# Patient Record
Sex: MALE | Race: WHITE | NOT HISPANIC OR LATINO | Employment: FULL TIME | URBAN - METROPOLITAN AREA
[De-identification: names, ages, dates, MRNs, and addresses within clinical notes are randomized per-mention and may not be internally consistent; named-entity substitution may affect disease eponyms.]

---

## 2022-01-06 ENCOUNTER — APPOINTMENT (OUTPATIENT)
Dept: CT IMAGING | Facility: HOSPITAL | Age: 52
End: 2022-01-06

## 2022-01-06 ENCOUNTER — HOSPITAL ENCOUNTER (OUTPATIENT)
Facility: HOSPITAL | Age: 52
Setting detail: OBSERVATION
Discharge: HOME | End: 2022-01-07
Attending: EMERGENCY MEDICINE | Admitting: EMERGENCY MEDICINE

## 2022-01-06 DIAGNOSIS — R07.9 CHEST PAIN, UNSPECIFIED TYPE: Primary | ICD-10-CM

## 2022-01-06 DIAGNOSIS — B34.8 INFECTION DUE TO HUMAN METAPNEUMOVIRUS (HMPV): ICD-10-CM

## 2022-01-06 LAB
ALBUMIN SERPL-MCNC: 4.6 G/DL (ref 3.5–5.2)
ALBUMIN/GLOB SERPL: 1.5 G/DL
ALP SERPL-CCNC: 94 U/L (ref 39–117)
ALT SERPL W P-5'-P-CCNC: 33 U/L (ref 1–41)
ANION GAP SERPL CALCULATED.3IONS-SCNC: 13 MMOL/L (ref 5–15)
AST SERPL-CCNC: 21 U/L (ref 1–40)
B PARAPERT DNA SPEC QL NAA+PROBE: NOT DETECTED
B PERT DNA SPEC QL NAA+PROBE: NOT DETECTED
BASOPHILS # BLD AUTO: 0.1 10*3/MM3 (ref 0–0.2)
BASOPHILS NFR BLD AUTO: 0.9 % (ref 0–1.5)
BILIRUB SERPL-MCNC: 0.3 MG/DL (ref 0–1.2)
BUN SERPL-MCNC: 13 MG/DL (ref 6–20)
BUN/CREAT SERPL: 13.4 (ref 7–25)
C PNEUM DNA NPH QL NAA+NON-PROBE: NOT DETECTED
CALCIUM SPEC-SCNC: 9.7 MG/DL (ref 8.6–10.5)
CHLORIDE SERPL-SCNC: 99 MMOL/L (ref 98–107)
CO2 SERPL-SCNC: 26 MMOL/L (ref 22–29)
CREAT SERPL-MCNC: 0.97 MG/DL (ref 0.76–1.27)
DEPRECATED RDW RBC AUTO: 40.7 FL (ref 37–54)
EOSINOPHIL # BLD AUTO: 0 10*3/MM3 (ref 0–0.4)
EOSINOPHIL NFR BLD AUTO: 0.6 % (ref 0.3–6.2)
ERYTHROCYTE [DISTWIDTH] IN BLOOD BY AUTOMATED COUNT: 13.3 % (ref 12.3–15.4)
FLUAV SUBTYP SPEC NAA+PROBE: NOT DETECTED
FLUBV RNA ISLT QL NAA+PROBE: NOT DETECTED
GFR SERPL CREATININE-BSD FRML MDRD: 82 ML/MIN/1.73
GLOBULIN UR ELPH-MCNC: 3.1 GM/DL
GLUCOSE SERPL-MCNC: 116 MG/DL (ref 65–99)
HADV DNA SPEC NAA+PROBE: NOT DETECTED
HCOV 229E RNA SPEC QL NAA+PROBE: NOT DETECTED
HCOV HKU1 RNA SPEC QL NAA+PROBE: NOT DETECTED
HCOV NL63 RNA SPEC QL NAA+PROBE: NOT DETECTED
HCOV OC43 RNA SPEC QL NAA+PROBE: NOT DETECTED
HCT VFR BLD AUTO: 45 % (ref 37.5–51)
HGB BLD-MCNC: 15.7 G/DL (ref 13–17.7)
HMPV RNA NPH QL NAA+NON-PROBE: DETECTED
HOLD SPECIMEN: NORMAL
HPIV1 RNA ISLT QL NAA+PROBE: NOT DETECTED
HPIV2 RNA SPEC QL NAA+PROBE: NOT DETECTED
HPIV3 RNA NPH QL NAA+PROBE: NOT DETECTED
HPIV4 P GENE NPH QL NAA+PROBE: NOT DETECTED
LYMPHOCYTES # BLD AUTO: 1.4 10*3/MM3 (ref 0.7–3.1)
LYMPHOCYTES NFR BLD AUTO: 17.3 % (ref 19.6–45.3)
M PNEUMO IGG SER IA-ACNC: NOT DETECTED
MCH RBC QN AUTO: 29.8 PG (ref 26.6–33)
MCHC RBC AUTO-ENTMCNC: 34.9 G/DL (ref 31.5–35.7)
MCV RBC AUTO: 85.2 FL (ref 79–97)
MONOCYTES # BLD AUTO: 0.7 10*3/MM3 (ref 0.1–0.9)
MONOCYTES NFR BLD AUTO: 8.4 % (ref 5–12)
NEUTROPHILS NFR BLD AUTO: 6 10*3/MM3 (ref 1.7–7)
NEUTROPHILS NFR BLD AUTO: 72.8 % (ref 42.7–76)
NRBC BLD AUTO-RTO: 0 /100 WBC (ref 0–0.2)
NT-PROBNP SERPL-MCNC: 16.5 PG/ML (ref 0–900)
PLATELET # BLD AUTO: 373 10*3/MM3 (ref 140–450)
PMV BLD AUTO: 6.7 FL (ref 6–12)
POTASSIUM SERPL-SCNC: 4 MMOL/L (ref 3.5–5.2)
PROT SERPL-MCNC: 7.7 G/DL (ref 6–8.5)
RBC # BLD AUTO: 5.28 10*6/MM3 (ref 4.14–5.8)
RHINOVIRUS RNA SPEC NAA+PROBE: NOT DETECTED
RSV RNA NPH QL NAA+NON-PROBE: NOT DETECTED
SARS-COV-2 RNA NPH QL NAA+NON-PROBE: NOT DETECTED
SODIUM SERPL-SCNC: 138 MMOL/L (ref 136–145)
TROPONIN T SERPL-MCNC: <0.01 NG/ML (ref 0–0.03)
TROPONIN T SERPL-MCNC: <0.01 NG/ML (ref 0–0.03)
WBC NRBC COR # BLD: 8.2 10*3/MM3 (ref 3.4–10.8)

## 2022-01-06 PROCEDURE — 71275 CT ANGIOGRAPHY CHEST: CPT

## 2022-01-06 PROCEDURE — 36415 COLL VENOUS BLD VENIPUNCTURE: CPT | Performed by: PHYSICIAN ASSISTANT

## 2022-01-06 PROCEDURE — 85025 COMPLETE CBC W/AUTO DIFF WBC: CPT | Performed by: NURSE PRACTITIONER

## 2022-01-06 PROCEDURE — G0378 HOSPITAL OBSERVATION PER HR: HCPCS

## 2022-01-06 PROCEDURE — 0202U NFCT DS 22 TRGT SARS-COV-2: CPT | Performed by: NURSE PRACTITIONER

## 2022-01-06 PROCEDURE — 93005 ELECTROCARDIOGRAM TRACING: CPT | Performed by: EMERGENCY MEDICINE

## 2022-01-06 PROCEDURE — 84484 ASSAY OF TROPONIN QUANT: CPT | Performed by: PHYSICIAN ASSISTANT

## 2022-01-06 PROCEDURE — 99284 EMERGENCY DEPT VISIT MOD MDM: CPT

## 2022-01-06 PROCEDURE — 84484 ASSAY OF TROPONIN QUANT: CPT | Performed by: NURSE PRACTITIONER

## 2022-01-06 PROCEDURE — 0 IOPAMIDOL PER 1 ML: Performed by: NURSE PRACTITIONER

## 2022-01-06 PROCEDURE — 83880 ASSAY OF NATRIURETIC PEPTIDE: CPT | Performed by: NURSE PRACTITIONER

## 2022-01-06 PROCEDURE — 80053 COMPREHEN METABOLIC PANEL: CPT | Performed by: NURSE PRACTITIONER

## 2022-01-06 PROCEDURE — 93005 ELECTROCARDIOGRAM TRACING: CPT

## 2022-01-06 RX ORDER — ACETAMINOPHEN 325 MG/1
650 TABLET ORAL EVERY 4 HOURS PRN
Status: DISCONTINUED | OUTPATIENT
Start: 2022-01-06 | End: 2022-01-07 | Stop reason: HOSPADM

## 2022-01-06 RX ORDER — AMITRIPTYLINE HYDROCHLORIDE 10 MG/1
10 TABLET, FILM COATED ORAL NIGHTLY
COMMUNITY

## 2022-01-06 RX ORDER — CHOLECALCIFEROL (VITAMIN D3) 125 MCG
5 CAPSULE ORAL NIGHTLY PRN
Status: DISCONTINUED | OUTPATIENT
Start: 2022-01-06 | End: 2022-01-07 | Stop reason: HOSPADM

## 2022-01-06 RX ORDER — ONDANSETRON 2 MG/ML
4 INJECTION INTRAMUSCULAR; INTRAVENOUS EVERY 6 HOURS PRN
Status: DISCONTINUED | OUTPATIENT
Start: 2022-01-06 | End: 2022-01-07 | Stop reason: HOSPADM

## 2022-01-06 RX ORDER — CHOLECALCIFEROL (VITAMIN D3) 125 MCG
6 CAPSULE ORAL NIGHTLY
COMMUNITY

## 2022-01-06 RX ORDER — ASCORBIC ACID 500 MG
1200 TABLET ORAL DAILY
COMMUNITY

## 2022-01-06 RX ORDER — UBIDECARENONE 100 MG
200 CAPSULE ORAL DAILY
COMMUNITY

## 2022-01-06 RX ADMIN — IOPAMIDOL 100 ML: 755 INJECTION, SOLUTION INTRAVENOUS at 16:21

## 2022-01-06 NOTE — ED PROVIDER NOTES
Subjective     Chief complaint: Multiple complaints        Context:patient is a 51-year-old male who comes in with family member with multiple complaints.  He states over the last month and a half he has had some lightheadedness some sharp right-sided chest pain with some shortness of breath.  He denies any fever.  He states sometimes it is worse after eating and has had some nausea.  He denies any vomiting or diarrhea.  He states he had a outpatient CT done at the [facility] and they saw a small pulmonary nodule in the right side and is scheduled for an outpatient CT tomorrow, he states he came in today because it has been going on for so long and he did not feel like he should wait.  He denies any swelling to his legs or feet recent trauma surgery mobilization history of DVT PE or exogenous findings.  He denies any cardiac history with parents, states his brother did have an MI at age 50.  He states he is a non-smoker.  He denies any vaping or IVDA.    Duration: A month and a half    Timing: waxes and wanes    Severity: moderate    Associated symptoms: denies          PCP:              Review of Systems   Constitutional: Negative for fever.   HENT: Negative.    Eyes: Negative for visual disturbance.   Respiratory: Positive for cough and shortness of breath.    Cardiovascular: Positive for chest pain.   Gastrointestinal: Negative.    Genitourinary: Negative.    Musculoskeletal: Negative.    Skin: Negative.    Allergic/Immunologic: Negative for immunocompromised state.   Neurological: Negative.    Hematological: Does not bruise/bleed easily.   Psychiatric/Behavioral: Negative for confusion.       No past medical history on file.    No Known Allergies    No past surgical history on file.    No family history on file.    Social History     Socioeconomic History   • Marital status:            Objective   Physical Exam     Vital signs and triage nurse note reviewed.   Constitutional: Awake,  alert; well-developed and well-nourished.  Nontoxic in appearance.  Family at bedside  HEENT: Normocephalic, atraumatic; pupils are PERRL with intact EOM; oropharynx is pink and moist without exudate or erythema.   Neck: Supple, full range of motion without pain;     Cardiovascular: Regular rate and rhythm, normal S1-S2.   Pulmonary: Respiratory effort regular nonlabored, breath sounds clear to auscultation all fields.   Abdomen: Soft, nontender nondistended with normoactive bowel sounds; no rebound or guarding.   Musculoskeletal: Independent range of motion of all extremities with no palpable tenderness or edema.   Neuro: Alert oriented x3, speech is clear and appropriate, GCS 15   Skin:  Fleshtone warm, dry, intact; no erythematous or petechial rash or lesion       Procedures       EKG viewed by me and interpreted by Dr. Prado sinus rhythm rate of 86, no acute changes  comparison: No prior for comparison      ED Course  ED Course as of 01/06/22 1746   Thu Jan 06, 2022   1344 Seen after being placed in room from triage [JW]   1524 Waiting on patient to go to CT [JW]   1606 Patient care was transferred to me from Deyanira MARKHAM pending CT results and disposition [AA]      ED Course User Index  [AA] Phillip Bran PA  [JW] Marjan Barrett, RAUL      Labs Reviewed   RESPIRATORY PANEL PCR W/ COVID-19 (SARS-COV-2) JORGE/LAMONT/CARLITA/PAD/[facility] IN-HOUSE, NP SWAB IN UTM/VTP, 3-4 HR TAT - Abnormal; Notable for the following components:       Result Value    Human Metapneumovirus Detected (*)     All other components within normal limits    Narrative:     In the setting of a positive respiratory panel with a viral infection PLUS a negative procalcitonin without other underlying concern for bacterial infection, consider observing off antibiotics or discontinuation of antibiotics and continue supportive care. If the respiratory panel is positive for atypical bacterial infection (Bordetella pertussis, Chlamydophila  pneumoniae, or Mycoplasma pneumoniae), consider antibiotic de-escalation to target atypical bacterial infection.   COMPREHENSIVE METABOLIC PANEL - Abnormal; Notable for the following components:    Glucose 116 (*)     All other components within normal limits    Narrative:     GFR Normal >60  Chronic Kidney Disease <60  Kidney Failure <15     CBC WITH AUTO DIFFERENTIAL - Abnormal; Notable for the following components:    Lymphocyte % 17.3 (*)     All other components within normal limits   BNP (IN-HOUSE) - Normal    Narrative:     Among patients with dyspnea, NT-proBNP is highly sensitive for the detection of acute congestive heart failure. In addition NT-proBNP of <300 pg/ml effectively rules out acute congestive heart failure with 99% negative predictive value.    Results may be falsely decreased if patient taking Biotin.     TROPONIN (IN-HOUSE) - Normal    Narrative:     Troponin T Reference Range:  <= 0.03 ng/mL-   Negative for AMI  >0.03 ng/mL-     Abnormal for myocardial necrosis.  Clinicians would have to utilize clinical acumen, EKG, Troponin and serial changes to determine if it is an Acute Myocardial Infarction or myocardial injury due to an underlying chronic condition.       Results may be falsely decreased if patient taking Biotin.     CBC AND DIFFERENTIAL    Narrative:     The following orders were created for panel order CBC & Differential.  Procedure                               Abnormality         Status                     ---------                               -----------         ------                     CBC Auto Differential[[id number]]        Abnormal            Final result                 Please view results for these tests on the individual orders.   EXTRA TUBES    Narrative:     The following orders were created for panel order Extra Tubes.  Procedure                               Abnormality         Status                     ---------                               -----------          ------                     Gold Top - SST[[id number]]                                   Final result                 Please view results for these tests on the individual orders.   GOLD TOP - SST     Medications   iopamidol (ISOVUE-370) 76 % injection 100 mL (100 mL Intravenous Given 1/6/22 1621)     CT Angiogram Chest    Result Date: 1/6/2022  Unremarkable thoracic aorta. No dissection  Electronically Signed By-Milton Leonard On:1/6/2022 5:12 PM This report was finalized on [id number] by  Milton Leonard, .    Prior to Admission medications    Not on File     CT Angiogram Chest    Result Date: 1/6/2022  Unremarkable thoracic aorta. No dissection  Electronically Signed By-Milton Leonard On:1/6/2022 5:12 PM This report was finalized on [id number] by  Milton Leonard, .                                               MDM  Number of Diagnoses or Management Options  Chest pain, unspecified type  Infection due to human metapneumovirus (hMPV)  Diagnosis management comments: Chart review:COMPARISON:  CTA AP 06/05/2018.     FINDINGS:   Abdomen:   The lung bases are clear.  There is a 3 mm pulmonary nodule in the right middle   lobe (image 2 axial images).  Previously identified larger nodule in the right   middle lobe is not included in field-of-view.  The liver is normal.  Gallbladder is   surgically absent.  The pancreas, spleen, adrenal glands, and kidneys demonstrate   no acute pathology.     There is no free air or lymph node enlargement.  Abdominal aorta is not aneurysmal.    There is redemonstration of mesenteric panniculitis with stranding and mildly   prominent lymph nodes which appear slightly progressed since prior study.     Pelvis:   There is no bowel wall thickening or obstruction.  There is moderate colonic stool   which is greater in the right colon.  The appendix is normal.  There is no free   fluid.  Lymph nodes are not enlarged.  Urinary bladder is unremarkable.     Skeleton:     There are no acute  fractures.  No suspicious bony lesions.     IMPRESSION:   Stranding and mildly prominent lymph nodes within the abdomen at the small bowel   mesentery compatible with mesenteric panniculitis which appears slightly progressed   since prior study.     No other acute inflammatory changes in the abdomen and pelvis.     3 mm pulmonary nodule right middle lobe remains stable.  Is identified larger   nodule in the right middle lobe is not demonstrated in the field-of-view.   Recommend follow-up CT the chest for further evaluation.         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  Care will be transferred to [facility] pending CTA with plan to admit for cardiac evaluation    Patient care transferred to me from Deyanira MARKHAM pending CTA results and disposition.  Labs reviewed by myself patient was found to have metapneumovirus CBC CMP troponin BNP fairly unremarkable. CTA was unremarkable as above.  Lab results and findings were discussed with the patient and family at bedside.  Patient will be admitted to the ED observation unit for further cardiac evaluation including echocardiogram, stress test, cardiology consult.  Patient was in agreement this plan.  All questions were answered at bedside.       Amount and/or Complexity of Data Reviewed  Clinical lab tests: reviewed  Tests in the radiology section of CPT®: reviewed  Tests in the medicine section of CPT®: reviewed        Final diagnoses:   Chest pain, unspecified type   Infection due to human metapneumovirus (hMPV)       ED Disposition  ED Disposition     ED Disposition Condition Comment    Decision to Admit            No follow-up provider  specified.       Medication List      No changes were made to your prescriptions during this visit.          Phillip Bran PA  01/06/22 5424

## 2022-01-07 ENCOUNTER — APPOINTMENT (OUTPATIENT)
Dept: CARDIOLOGY | Facility: HOSPITAL | Age: 52
End: 2022-01-07

## 2022-01-07 ENCOUNTER — APPOINTMENT (OUTPATIENT)
Dept: NUCLEAR MEDICINE | Facility: HOSPITAL | Age: 52
End: 2022-01-07

## 2022-01-07 VITALS
HEART RATE: 73 BPM | BODY MASS INDEX: 28.44 KG/M2 | RESPIRATION RATE: 19 BRPM | OXYGEN SATURATION: 99 % | SYSTOLIC BLOOD PRESSURE: 126 MMHG | DIASTOLIC BLOOD PRESSURE: 80 MMHG | WEIGHT: 192 LBS | TEMPERATURE: 97.5 F | HEIGHT: 69 IN

## 2022-01-07 PROBLEM — H81.11 BENIGN PAROXYSMAL VERTIGO OF RIGHT EAR: Status: ACTIVE | Noted: 2022-01-07

## 2022-01-07 PROBLEM — A69.20 LYME DISEASE: Status: ACTIVE | Noted: 2022-01-07

## 2022-01-07 PROBLEM — I77.4 CELIAC ARTERY COMPRESSION SYNDROME: Status: ACTIVE | Noted: 2018-03-07

## 2022-01-07 PROBLEM — G90.9 UNSPECIFIED DISORDER OF AUTONOMIC NERVOUS SYSTEM: Status: ACTIVE | Noted: 2021-05-17

## 2022-01-07 PROBLEM — B34.8 INFECTION DUE TO HUMAN METAPNEUMOVIRUS (HMPV): Status: ACTIVE | Noted: 2022-01-07

## 2022-01-07 LAB
ANION GAP SERPL CALCULATED.3IONS-SCNC: 13 MMOL/L (ref 5–15)
BASOPHILS # BLD AUTO: 0.1 10*3/MM3 (ref 0–0.2)
BASOPHILS NFR BLD AUTO: 0.9 % (ref 0–1.5)
BH CV ECHO MEAS - % IVS THICK: 33.4 %
BH CV ECHO MEAS - % LVPW THICK: 49.9 %
BH CV ECHO MEAS - ACS: 1.9 CM
BH CV ECHO MEAS - AO MAX PG (FULL): 3.9 MMHG
BH CV ECHO MEAS - AO MAX PG: 7.2 MMHG
BH CV ECHO MEAS - AO MEAN PG (FULL): 1.7 MMHG
BH CV ECHO MEAS - AO MEAN PG: 3.7 MMHG
BH CV ECHO MEAS - AO ROOT AREA (BSA CORRECTED): 1.3
BH CV ECHO MEAS - AO ROOT AREA: 5.7 CM^2
BH CV ECHO MEAS - AO ROOT DIAM: 2.7 CM
BH CV ECHO MEAS - AO V2 MAX: 134.6 CM/SEC
BH CV ECHO MEAS - AO V2 MEAN: 89.3 CM/SEC
BH CV ECHO MEAS - AO V2 VTI: 22.9 CM
BH CV ECHO MEAS - ASC AORTA: 2.6 CM
BH CV ECHO MEAS - AVA(I,A): 2.8 CM^2
BH CV ECHO MEAS - AVA(I,D): 2.8 CM^2
BH CV ECHO MEAS - AVA(V,A): 2.6 CM^2
BH CV ECHO MEAS - AVA(V,D): 2.6 CM^2
BH CV ECHO MEAS - BSA(HAYCOCK): 2.1 M^2
BH CV ECHO MEAS - BSA: 2 M^2
BH CV ECHO MEAS - BZI_BMI: 28.4 KILOGRAMS/M^2
BH CV ECHO MEAS - BZI_METRIC_HEIGHT: 175.3 CM
BH CV ECHO MEAS - BZI_METRIC_WEIGHT: 87.1 KG
BH CV ECHO MEAS - EDV(CUBED): 88.1 ML
BH CV ECHO MEAS - EDV(MOD-SP4): 73 ML
BH CV ECHO MEAS - EDV(TEICH): 90 ML
BH CV ECHO MEAS - EF(CUBED): 63.3 %
BH CV ECHO MEAS - EF(MOD-BP): 62 %
BH CV ECHO MEAS - EF(MOD-SP4): 61.9 %
BH CV ECHO MEAS - EF(TEICH): 55 %
BH CV ECHO MEAS - ESV(CUBED): 32.3 ML
BH CV ECHO MEAS - ESV(MOD-SP4): 27.8 ML
BH CV ECHO MEAS - ESV(TEICH): 40.5 ML
BH CV ECHO MEAS - FS: 28.4 %
BH CV ECHO MEAS - IVS/LVPW: 0.92
BH CV ECHO MEAS - IVSD: 0.99 CM
BH CV ECHO MEAS - IVSS: 1.3 CM
BH CV ECHO MEAS - LA DIMENSION(2D): 3.7 CM
BH CV ECHO MEAS - LV DIASTOLIC VOL/BSA (35-75): 35.9 ML/M^2
BH CV ECHO MEAS - LV MASS(C)D: 156.8 GRAMS
BH CV ECHO MEAS - LV MASS(C)DI: 77.2 GRAMS/M^2
BH CV ECHO MEAS - LV MASS(C)S: 163.4 GRAMS
BH CV ECHO MEAS - LV MASS(C)SI: 80.5 GRAMS/M^2
BH CV ECHO MEAS - LV MAX PG: 3.4 MMHG
BH CV ECHO MEAS - LV MEAN PG: 2 MMHG
BH CV ECHO MEAS - LV SYSTOLIC VOL/BSA (12-30): 13.7 ML/M^2
BH CV ECHO MEAS - LV V1 MAX: 92.1 CM/SEC
BH CV ECHO MEAS - LV V1 MEAN: 66.4 CM/SEC
BH CV ECHO MEAS - LV V1 VTI: 16.8 CM
BH CV ECHO MEAS - LVIDD: 4.4 CM
BH CV ECHO MEAS - LVIDS: 3.2 CM
BH CV ECHO MEAS - LVOT AREA: 3.8 CM^2
BH CV ECHO MEAS - LVOT DIAM: 2.2 CM
BH CV ECHO MEAS - LVPWD: 1.1 CM
BH CV ECHO MEAS - LVPWS: 1.6 CM
BH CV ECHO MEAS - MV A MAX VEL: 28.7 CM/SEC
BH CV ECHO MEAS - MV DEC SLOPE: 256 CM/SEC^2
BH CV ECHO MEAS - MV DEC TIME: 0.24 SEC
BH CV ECHO MEAS - MV E MAX VEL: 31.8 CM/SEC
BH CV ECHO MEAS - MV E/A: 1.1
BH CV ECHO MEAS - MV MAX PG: 2.4 MMHG
BH CV ECHO MEAS - MV MEAN PG: 1.3 MMHG
BH CV ECHO MEAS - MV V2 MAX: 77.3 CM/SEC
BH CV ECHO MEAS - MV V2 MEAN: 53.6 CM/SEC
BH CV ECHO MEAS - MV V2 VTI: 21.3 CM
BH CV ECHO MEAS - MVA(VTI): 3 CM^2
BH CV ECHO MEAS - PA ACC TIME: 0.14 SEC
BH CV ECHO MEAS - PA MAX PG (FULL): 4 MMHG
BH CV ECHO MEAS - PA MAX PG: 6.1 MMHG
BH CV ECHO MEAS - PA MEAN PG (FULL): 1.9 MMHG
BH CV ECHO MEAS - PA MEAN PG: 3.1 MMHG
BH CV ECHO MEAS - PA PR(ACCEL): 16.7 MMHG
BH CV ECHO MEAS - PA V2 MAX: 123 CM/SEC
BH CV ECHO MEAS - PA V2 MEAN: 82.2 CM/SEC
BH CV ECHO MEAS - PA V2 VTI: 20.2 CM
BH CV ECHO MEAS - PULM A REVS DUR: 0.08 SEC
BH CV ECHO MEAS - PULM A REVS VEL: 63.9 CM/SEC
BH CV ECHO MEAS - PULM DIAS VEL: 50.8 CM/SEC
BH CV ECHO MEAS - PULM S/D: 1.4
BH CV ECHO MEAS - PULM SYS VEL: 70.4 CM/SEC
BH CV ECHO MEAS - RAP SYSTOLE: 3 MMHG
BH CV ECHO MEAS - RV MAX PG: 2.1 MMHG
BH CV ECHO MEAS - RV MEAN PG: 1.2 MMHG
BH CV ECHO MEAS - RV V1 MAX: 72.1 CM/SEC
BH CV ECHO MEAS - RV V1 MEAN: 52.1 CM/SEC
BH CV ECHO MEAS - RV V1 VTI: 13.7 CM
BH CV ECHO MEAS - RVDD: 2.9 CM
BH CV ECHO MEAS - RVSP: 28.8 MMHG
BH CV ECHO MEAS - SI(AO): 64.2 ML/M^2
BH CV ECHO MEAS - SI(CUBED): 27.5 ML/M^2
BH CV ECHO MEAS - SI(LVOT): 31.2 ML/M^2
BH CV ECHO MEAS - SI(MOD-SP4): 22.3 ML/M^2
BH CV ECHO MEAS - SI(TEICH): 24.4 ML/M^2
BH CV ECHO MEAS - SV(AO): 130.3 ML
BH CV ECHO MEAS - SV(CUBED): 55.8 ML
BH CV ECHO MEAS - SV(LVOT): 63.3 ML
BH CV ECHO MEAS - SV(MOD-SP4): 45.2 ML
BH CV ECHO MEAS - SV(TEICH): 49.5 ML
BH CV ECHO MEAS - TR MAX VEL: 254.1 CM/SEC
BH CV REST NUCLEAR ISOTOPE DOSE: 7.2 MCI
BH CV STRESS BP STAGE 1: NORMAL
BH CV STRESS BP STAGE 2: NORMAL
BH CV STRESS BP STAGE 3: NORMAL
BH CV STRESS COMMENTS STAGE 1: NORMAL
BH CV STRESS COMMENTS STAGE 2: NORMAL
BH CV STRESS DOSE REGADENOSON STAGE 1: 0.4
BH CV STRESS DURATION MIN STAGE 1: 0
BH CV STRESS DURATION MIN STAGE 2: 4
BH CV STRESS DURATION SEC STAGE 1: 10
BH CV STRESS DURATION SEC STAGE 2: 0
BH CV STRESS HR STAGE 1: 116
BH CV STRESS HR STAGE 2: 124
BH CV STRESS HR STAGE 3: 119
BH CV STRESS NUCLEAR ISOTOPE DOSE: 21 MCI
BH CV STRESS PROTOCOL 1: NORMAL
BH CV STRESS RECOVERY BP: NORMAL MMHG
BH CV STRESS RECOVERY HR: 91 BPM
BH CV STRESS STAGE 1: 1
BH CV STRESS STAGE 2: 2
BH CV STRESS STAGE 3: 3
BUN SERPL-MCNC: 16 MG/DL (ref 6–20)
BUN/CREAT SERPL: 17.4 (ref 7–25)
CALCIUM SPEC-SCNC: 9.5 MG/DL (ref 8.6–10.5)
CHLORIDE SERPL-SCNC: 100 MMOL/L (ref 98–107)
CO2 SERPL-SCNC: 25 MMOL/L (ref 22–29)
CREAT SERPL-MCNC: 0.92 MG/DL (ref 0.76–1.27)
DEPRECATED RDW RBC AUTO: 39.8 FL (ref 37–54)
EOSINOPHIL # BLD AUTO: 0.2 10*3/MM3 (ref 0–0.4)
EOSINOPHIL NFR BLD AUTO: 2.4 % (ref 0.3–6.2)
ERYTHROCYTE [DISTWIDTH] IN BLOOD BY AUTOMATED COUNT: 13.3 % (ref 12.3–15.4)
GFR SERPL CREATININE-BSD FRML MDRD: 87 ML/MIN/1.73
GLUCOSE SERPL-MCNC: 101 MG/DL (ref 65–99)
HCT VFR BLD AUTO: 41.9 % (ref 37.5–51)
HGB BLD-MCNC: 14.8 G/DL (ref 13–17.7)
LV EF NUC BP: 59 %
LYMPHOCYTES # BLD AUTO: 1.6 10*3/MM3 (ref 0.7–3.1)
LYMPHOCYTES NFR BLD AUTO: 21.1 % (ref 19.6–45.3)
MAXIMAL PREDICTED HEART RATE: 170 BPM
MAXIMAL PREDICTED HEART RATE: 170 BPM
MCH RBC QN AUTO: 30.7 PG (ref 26.6–33)
MCHC RBC AUTO-ENTMCNC: 35.3 G/DL (ref 31.5–35.7)
MCV RBC AUTO: 87.2 FL (ref 79–97)
MONOCYTES # BLD AUTO: 1 10*3/MM3 (ref 0.1–0.9)
MONOCYTES NFR BLD AUTO: 12.7 % (ref 5–12)
NEUTROPHILS NFR BLD AUTO: 4.8 10*3/MM3 (ref 1.7–7)
NEUTROPHILS NFR BLD AUTO: 62.9 % (ref 42.7–76)
NRBC BLD AUTO-RTO: 0 /100 WBC (ref 0–0.2)
PERCENT MAX PREDICTED HR: 72.94 %
PLATELET # BLD AUTO: 307 10*3/MM3 (ref 140–450)
PMV BLD AUTO: 7.2 FL (ref 6–12)
POTASSIUM SERPL-SCNC: 4.4 MMOL/L (ref 3.5–5.2)
RBC # BLD AUTO: 4.8 10*6/MM3 (ref 4.14–5.8)
SODIUM SERPL-SCNC: 138 MMOL/L (ref 136–145)
STRESS BASELINE BP: NORMAL MMHG
STRESS BASELINE HR: 76 BPM
STRESS PERCENT HR: 86 %
STRESS POST PEAK BP: NORMAL MMHG
STRESS POST PEAK HR: 124 BPM
STRESS TARGET HR: 145 BPM
STRESS TARGET HR: 145 BPM
WBC NRBC COR # BLD: 7.6 10*3/MM3 (ref 3.4–10.8)

## 2022-01-07 PROCEDURE — G0378 HOSPITAL OBSERVATION PER HR: HCPCS

## 2022-01-07 PROCEDURE — 25010000002 REGADENOSON 0.4 MG/5ML SOLUTION: Performed by: EMERGENCY MEDICINE

## 2022-01-07 PROCEDURE — 78452 HT MUSCLE IMAGE SPECT MULT: CPT

## 2022-01-07 PROCEDURE — 93017 CV STRESS TEST TRACING ONLY: CPT

## 2022-01-07 PROCEDURE — 0 TECHNETIUM TETROFOSMIN KIT: Performed by: EMERGENCY MEDICINE

## 2022-01-07 PROCEDURE — A9502 TC99M TETROFOSMIN: HCPCS | Performed by: EMERGENCY MEDICINE

## 2022-01-07 PROCEDURE — 96374 THER/PROPH/DIAG INJ IV PUSH: CPT

## 2022-01-07 PROCEDURE — 93306 TTE W/DOPPLER COMPLETE: CPT | Performed by: INTERNAL MEDICINE

## 2022-01-07 PROCEDURE — 78452 HT MUSCLE IMAGE SPECT MULT: CPT | Performed by: INTERNAL MEDICINE

## 2022-01-07 PROCEDURE — 85025 COMPLETE CBC W/AUTO DIFF WBC: CPT | Performed by: PHYSICIAN ASSISTANT

## 2022-01-07 PROCEDURE — 93018 CV STRESS TEST I&R ONLY: CPT | Performed by: INTERNAL MEDICINE

## 2022-01-07 PROCEDURE — 80048 BASIC METABOLIC PNL TOTAL CA: CPT | Performed by: PHYSICIAN ASSISTANT

## 2022-01-07 PROCEDURE — 25010000002 FUROSEMIDE PER 20 MG: Performed by: EMERGENCY MEDICINE

## 2022-01-07 PROCEDURE — 99204 OFFICE O/P NEW MOD 45 MIN: CPT | Performed by: INTERNAL MEDICINE

## 2022-01-07 PROCEDURE — 93306 TTE W/DOPPLER COMPLETE: CPT

## 2022-01-07 RX ORDER — AMITRIPTYLINE HYDROCHLORIDE 10 MG/1
10 TABLET, FILM COATED ORAL NIGHTLY
Status: DISCONTINUED | OUTPATIENT
Start: 2022-01-07 | End: 2022-01-07 | Stop reason: HOSPADM

## 2022-01-07 RX ORDER — ASCORBIC ACID 500 MG
1200 TABLET ORAL DAILY
Status: DISCONTINUED | OUTPATIENT
Start: 2022-01-07 | End: 2022-01-07 | Stop reason: HOSPADM

## 2022-01-07 RX ORDER — CHOLECALCIFEROL (VITAMIN D3) 125 MCG
5 CAPSULE ORAL NIGHTLY
Status: DISCONTINUED | OUTPATIENT
Start: 2022-01-07 | End: 2022-01-07 | Stop reason: HOSPADM

## 2022-01-07 RX ORDER — FUROSEMIDE 10 MG/ML
40 INJECTION INTRAMUSCULAR; INTRAVENOUS
Status: COMPLETED | OUTPATIENT
Start: 2022-01-07 | End: 2022-01-07

## 2022-01-07 RX ADMIN — FUROSEMIDE 40 MG: 10 INJECTION, SOLUTION INTRAVENOUS at 09:37

## 2022-01-07 RX ADMIN — OXYCODONE HYDROCHLORIDE AND ACETAMINOPHEN 1250 MG: 500 TABLET ORAL at 11:02

## 2022-01-07 RX ADMIN — REGADENOSON 0.4 MG: 0.08 INJECTION, SOLUTION INTRAVENOUS at 10:15

## 2022-01-07 RX ADMIN — Medication 5000 UNITS: at 11:02

## 2022-01-07 RX ADMIN — TETROFOSMIN 1 DOSE: 1.38 INJECTION, POWDER, LYOPHILIZED, FOR SOLUTION INTRAVENOUS at 07:17

## 2022-01-07 RX ADMIN — TETROFOSMIN 1 DOSE: 1.38 INJECTION, POWDER, LYOPHILIZED, FOR SOLUTION INTRAVENOUS at 09:37

## 2022-01-07 NOTE — DISCHARGE INSTRUCTIONS
Please call [phone] for assistance in establishing a primary care provider (PCP) in your area.

## 2022-01-07 NOTE — CASE MANAGEMENT/SOCIAL WORK
Continued Stay Note  BRENDAN Turk     Patient Name: Hayden Mulligan  MRN: [id number]  Today's Date: 1/7/2022    Admit Date: 1/6/2022     Discharge Plan     Row Name 01/07/22 1309       Plan    Plan Comments noted d/c orders.                    Expected Discharge Date and Time     Expected Discharge Date Expected Discharge Time    Jan 7, 2022       Phone communication or documentation only - no physical contact with patient or family.        Monica Schmid RN

## 2022-01-07 NOTE — CONSULTS
[facility]   CONSULT NOTE    Referring Provider: Phillip Bran, ED PA   Reason for Consultation: chest pain    Patient Care Team:  Provider, No Known as PCP - General    Chief complaint - shortness of breath, chest pain         History of present illness:  Hayden Mulligan is a 51 y.o. male with past medical history of  chronic intermittent nausea secondary to celiac compression syndrome with prior mesenteric panniculitis, MALS procedure  presented to the ED with shortness of breath and right sided sharp chest pain that was worse with exertion and deep breathing. Patient currently denies any chest pain, no fever, palpitations lower extremity edema or syncope.  In the ED patient's RVP was positive for human metapneumovirus.  EKG showed some questionable ST elevation that is early repolarization.   He reports that he had similar respiratory symptoms in late November, early December however was negative for COVID.       Review of Systems   Constitutional: Positive for malaise/fatigue. Negative for fever.   HENT: Negative for ear pain and nosebleeds.    Eyes: Negative for blurred vision and double vision.   Cardiovascular: Positive for chest pain (right sided ) and dyspnea on exertion. Negative for irregular heartbeat, leg swelling, near-syncope, orthopnea and palpitations.   Respiratory: Positive for cough, shortness of breath and sputum production.    Skin: Negative for rash.   Musculoskeletal: Negative for joint pain.   Gastrointestinal: Positive for nausea. Negative for abdominal pain and vomiting.   Neurological: Positive for dizziness. Negative for focal weakness and headaches.   Psychiatric/Behavioral: Negative for depression. The patient is not nervous/anxious.    All other systems reviewed and are negative.      History  History reviewed. No pertinent past medical history.    History reviewed. No pertinent surgical history.    Family History   Problem Relation Age of Onset   •  "Heart disease Daughter    • Heart disease Paternal Grandfather        Social History     Tobacco Use   • Smoking status: Never Smoker   • Smokeless tobacco: Never Used   Substance Use Topics   • Alcohol use: Not Currently   • Drug use: Never        Medications Prior to Admission   Medication Sig Dispense Refill Last Dose   • Acetylcysteine 500 MG capsule Take 1,000 mg by mouth Daily.      • amitriptyline (ELAVIL) 10 MG tablet Take 10 mg by mouth Every Night.      • ascorbic acid (VITAMIN C) 500 MG tablet Take 1,200 mg by mouth Daily.      • Cats Claw, Uncaria tomentosa, (CATS CLAW PO) Take  by mouth.      • coenzyme Q10 100 MG capsule Take 200 mg by mouth Daily.      • melatonin 5 MG tablet tablet Take 6 mg by mouth Every Night.      • vitamin D3 (Vitamin D) 125 MCG (5000 UT) capsule capsule Take 5,000 Units by mouth Daily.            Patient has no known allergies.    Scheduled Meds:amitriptyline, 10 mg, Oral, Nightly  ascorbic acid, 1,250 mg, Oral, Daily  melatonin, 5 mg, Oral, Nightly  vitamin D3, 5,000 Units, Oral, Daily      Continuous Infusions:   PRN Meds:.•  acetaminophen  •  melatonin  •  ondansetron        VITAL SIGNS  Vitals:    01/06/22 2151 01/07/22 0000 01/07/22 0623 01/07/22 1058   BP: 117/75 113/76 126/80 129/82   BP Location: Right arm Right arm Right arm Right arm   Patient Position:  Lying Lying Lying   Pulse: 75 78 76 73   Resp: 19 18 18 19   Temp: 98.1 °F (36.7 °C) 97.5 °F (36.4 °C) 97.4 °F (36.3 °C) 97.5 °F (36.4 °C)   TempSrc: Oral Oral Oral Oral   SpO2: 97% 98% 96% 99%   Weight: 87.1 kg (192 lb 0.3 oz)      Height: 175.3 cm (69\")          Flowsheet Rows      First Filed Value   Admission Height 175.3 cm (69\") Documented at 01/06/2022 1312   Admission Weight 88 kg (194 lb 0.1 oz) Documented at 01/06/2022 1312           TELEMETRY: sinus rhythm     Physical Exam:  Vitals and nursing note reviewed.   Constitutional:       Appearance: Healthy appearance. Not in distress.   Neck:      Vascular: No " JVR. JVD normal.   Pulmonary:      Effort: Pulmonary effort is normal.      Breath sounds: Normal breath sounds. No wheezing. No rhonchi. No rales.   Chest:      Chest wall: Not tender to palpatation.   Cardiovascular:      PMI at left midclavicular line. Normal rate. Regular rhythm. Normal S1. Normal S2.      Murmurs: There is no murmur.      No gallop. No click. No rub.   Pulses:     Intact distal pulses.   Edema:     Peripheral edema absent.   Abdominal:      General: Bowel sounds are normal.      Palpations: Abdomen is soft.      Tenderness: There is no abdominal tenderness.   Musculoskeletal: Normal range of motion.         General: No tenderness. Skin:     General: Skin is warm and dry.   Neurological:      General: No focal deficit present.      Mental Status: Alert and oriented to person, place and time.                  LAB RESULTS (LAST 7 DAYS)    CBC  Results from last 7 days   Lab Units 01/07/22  0421 01/06/22  1413   WBC 10*3/mm3 7.60 8.20   RBC 10*6/mm3 4.80 5.28   HEMOGLOBIN g/dL 14.8 15.7   HEMATOCRIT % 41.9 45.0   MCV fL 87.2 85.2   PLATELETS 10*3/mm3 307 373       BMP  Results from last 7 days   Lab Units 01/07/22  0421 01/06/22  1413   SODIUM mmol/L 138 138   POTASSIUM mmol/L 4.4 4.0   CHLORIDE mmol/L 100 99   CO2 mmol/L 25.0 26.0   BUN mg/dL 16 13   CREATININE mg/dL 0.92 0.97   GLUCOSE mg/dL 101* 116*       CMP   Results from last 7 days   Lab Units 01/07/22  0421 01/06/22  1413   SODIUM mmol/L 138 138   POTASSIUM mmol/L 4.4 4.0   CHLORIDE mmol/L 100 99   CO2 mmol/L 25.0 26.0   BUN mg/dL 16 13   CREATININE mg/dL 0.92 0.97   GLUCOSE mg/dL 101* 116*   ALBUMIN g/dL  --  4.60   BILIRUBIN mg/dL  --  0.3   ALK PHOS U/L  --  94   AST (SGOT) U/L  --  21   ALT (SGPT) U/L  --  33         BNP        TROPONIN  Results from last 7 days   Lab Units 01/06/22 2007   TROPONIN T ng/mL <0.010       CoAg        Creatinine Clearance  Estimated Creatinine Clearance: 105 mL/min (by C-G formula based on SCr of 0.92  mg/dL).    ABG        Radiology  CT Angiogram Chest    Result Date: 1/6/2022  Unremarkable thoracic aorta. No dissection  Electronically Signed By-Milton Leonard On:1/6/2022 5:12 PM This report was finalized on [id number] by  Milton Leonard, .          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  Patient is taking herbal Cats Claw   Non smoker   Family history grandfather with MI age 50 and brother MI age 50      PLAN  Patient presented with right sided chest pain and shortness of breath   EKG ST elevation secondary to early repolarization.   Patient is positive for human metapneumovirus  Patient's chest pain is atypical- right sided worse with breathing     Patient underwent pharmacological stress myoview this morning and it showed no ischemia  Patient also had an echocardiogram which will be reviewed shortly      I discussed the patients findings and my recommendations with patient      Electronically signed by Randal Barnes MD, 01/07/22, 12:18 PM EST.

## 2022-01-07 NOTE — PLAN OF CARE
Goal Outcome Evaluation:  Plan of Care Reviewed With: patient        Progress: improving  Outcome Summary: Pt stress test normal. Pt scheduled for discharge today

## 2022-01-07 NOTE — H&P
[facility] Observation Unit H&P    Patient Name: Hayden Mulligan  : 1970  MRN: [id number]  Primary Care Physician: Provider, No Known  Date of admission: 2022     Patient Care Team:  Provider, No Known as PCP - General          Subjective   History Present Illness     Chief Complaint:   Chief Complaint   Patient presents with   • Chest Pain     pt presents with chest pain right side states, right arm feels numb,dizziness, nausea every morning. has been going on for a while, pt has 2 negative covid test,          Mr. Mulligan is a 51 y.o.  presents to [facility] complaining of chest pain.      51-year-old male presents to the ER with a chief complaint of chest pain which is worse with inspiration, palpitations with dizziness starting on Thursday morning.  Patient has a history of SVT in the remote past and complains of palpitations over the last several days.  The patient reports shortness of breath with exertion.  The patient reports recent upper respiratory symptoms which he states started at the end of November and then improved but have worsened over the last several days.  The patient's wife is a teacher.  He states he did a Covid home test in early December which he reports was negative. The pain is described as sharp and is inconsistently exacerbated with inspiration.  Patient reports diaphoresis with activity earlier in the week. 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  The patient had MALS procedure earlier this year.  He has known pulmonary nodule which is under surveillance with scheduled outpatient CT for today.    51-year-old male presents to the ER with a chief complaint of chest pain which is been intermittent over the last week and associated with shortness of breath.    Patient reported family history includes half brother with maternal connection having had MI at age 52 and maternal grandfather with fatal  MI in his early 50s.      Review of Systems   Constitutional: Negative for chills and fever.   HENT: Negative for hearing loss, sore throat and stridor.    Cardiovascular: Positive for chest pain.   Respiratory: Positive for shortness of breath.    Gastrointestinal: Negative for nausea.   All other systems reviewed and are negative.          Personal History     Past Medical History: History reviewed. No pertinent past medical history.    Surgical History:    History reviewed. No pertinent surgical history.        Family History: family history includes Heart disease in his daughter and paternal grandfather. Otherwise pertinent FHx was reviewed and unremarkable.     Social History:  reports that he has never smoked. He has never used smokeless tobacco. He reports previous alcohol use. He reports that he does not use drugs.      Medications:  Prior to Admission medications    Medication Sig Start Date End Date Taking? Authorizing Provider   Acetylcysteine 500 MG capsule Take 1,000 mg by mouth Daily.   Yes Susana Marcos MD   amitriptyline (ELAVIL) 10 MG tablet Take 10 mg by mouth Every Night.   Yes Susana Marcos MD   ascorbic acid (VITAMIN C) 500 MG tablet Take 1,200 mg by mouth Daily.   Yes Susana Marcos MD   Cats Claw, Uncaria tomentosa, (CATS CLAW PO) Take  by mouth.   Yes Susana Marcos MD   coenzyme Q10 100 MG capsule Take 200 mg by mouth Daily.   Yes Susana Marcos MD   melatonin 5 MG tablet tablet Take 6 mg by mouth Every Night.   Yes Susana Marcos MD   vitamin D3 (Vitamin D) 125 MCG (5000 UT) capsule capsule Take 5,000 Units by mouth Daily.   Yes Susana Marcos MD       Allergies:  No Known Allergies    Objective   Objective     Vital Signs  Temp:  [97.4 °F (36.3 °C)-98.1 °F (36.7 °C)] 97.4 °F (36.3 °C)  Heart Rate:  [74-97] 76  Resp:  [10-22] 18  BP: (113-148)/(75-99) 126/80  SpO2:  [96 %-100 %] 96 %  on   ;   Device (Oxygen Therapy): room air  Body mass  index is 28.36 kg/m².    Physical Exam  Vitals and nursing note reviewed.   Constitutional:       Appearance: Normal appearance.   HENT:      Head: Normocephalic and atraumatic.      Right Ear: External ear normal.      Left Ear: External ear normal.      Nose: Nose normal. No congestion or rhinorrhea.      Mouth/Throat:      Mouth: Mucous membranes are moist.   Eyes:      General: No scleral icterus.        Right eye: No discharge.         Left eye: No discharge.      Extraocular Movements: Extraocular movements intact.      Conjunctiva/sclera: Conjunctivae normal.      Pupils: Pupils are equal, round, and reactive to light.   Cardiovascular:      Rate and Rhythm: Normal rate and regular rhythm.      Pulses: Normal pulses.      Heart sounds: Normal heart sounds.   Pulmonary:      Effort: Pulmonary effort is normal.      Breath sounds: Normal breath sounds.   Abdominal:      General: Bowel sounds are normal.      Palpations: Abdomen is soft.   Musculoskeletal:         General: Normal range of motion.      Cervical back: Normal range of motion and neck supple.      Right lower leg: No edema.      Left lower leg: No edema.   Skin:     General: Skin is warm and dry.      Capillary Refill: Capillary refill takes less than 2 seconds.   Neurological:      General: No focal deficit present.      Mental Status: He is alert.   Psychiatric:         Mood and Affect: Mood normal.         Behavior: Behavior normal.         Thought Content: Thought content normal.         Judgment: Judgment normal.           Results Review:  I have personally reviewed most recent cardiac tracings, lab results and radiology images and interpretations and agree with findings.    Results from last 7 days   Lab Units 01/07/22  0421   WBC 10*3/mm3 7.60   HEMOGLOBIN g/dL 14.8   HEMATOCRIT % 41.9   PLATELETS 10*3/mm3 307     Results from last 7 days   Lab Units 01/07/22  0421 01/06/22 2007 01/06/22  1413 01/06/22  1413   SODIUM mmol/L 138  --    < >  138   POTASSIUM mmol/L 4.4  --    < > 4.0   CHLORIDE mmol/L 100  --    < > 99   CO2 mmol/L 25.0  --    < > 26.0   BUN mg/dL 16  --    < > 13   CREATININE mg/dL 0.92  --    < > 0.97   GLUCOSE mg/dL 101*  --    < > 116*   CALCIUM mg/dL 9.5  --    < > 9.7   ALT (SGPT) U/L  --   --   --  33   AST (SGOT) U/L  --   --   --  21   TROPONIN T ng/mL  --  <0.010  --  <0.010   PROBNP pg/mL  --   --   --  16.5    < > = values in this interval not displayed.     Estimated Creatinine Clearance: 105 mL/min (by C-G formula based on SCr of 0.92 mg/dL).  Brief Urine Lab Results     None          Microbiology Results (last 10 days)     Procedure Component Value - Date/Time    Respiratory Panel PCR w/COVID-19(SARS-CoV-2) JORGE/LAMONT/CARLITA/PAD/COR/MAD/BRIANNE In-House, NP Swab in UTM/VTM, 3-4 HR TAT - Swab, Nasopharynx [[id number]]  (Abnormal) Collected: 01/06/22 1413    Lab Status: Final result Specimen: Swab from Nasopharynx Updated: 01/06/22 1518     ADENOVIRUS, PCR Not Detected     Coronavirus 229E Not Detected     Coronavirus HKU1 Not Detected     Coronavirus NL63 Not Detected     Coronavirus OC43 Not Detected     COVID19 Not Detected     Human Metapneumovirus Detected     Human Rhinovirus/Enterovirus Not Detected     Influenza A PCR Not Detected     Influenza B PCR Not Detected     Parainfluenza Virus 1 Not Detected     Parainfluenza Virus 2 Not Detected     Parainfluenza Virus 3 Not Detected     Parainfluenza Virus 4 Not Detected     RSV, PCR Not Detected     Bordetella pertussis pcr Not Detected     Bordetella parapertussis PCR Not Detected     Chlamydophila pneumoniae PCR Not Detected     Mycoplasma pneumo by PCR Not Detected    Narrative:      In the setting of a positive respiratory panel with a viral infection PLUS a negative procalcitonin without other underlying concern for bacterial infection, consider observing off antibiotics or discontinuation of antibiotics and continue supportive care. If the respiratory panel is positive for  atypical bacterial infection (Bordetella pertussis, Chlamydophila pneumoniae, or Mycoplasma pneumoniae), consider antibiotic de-escalation to target atypical bacterial infection.    COVID-19,[facility] ROUTINE, NP/OP SWAB IN TRANSPORT MEDIA OR ESWAB 72 HR TAT - , [[id number]] Collected: 12/30/21 1036    Lab Status: Final result Updated: 01/06/22 1311     SARS-CoV-2, LORENA Negative     Comment: Negative: SARS-CoV-2 not detected  Testing did not identify the presence of SARS-CoV-2 (the virus that causes COVID-19) in the patient's sample.  Many factors can impact the sensitivity of this test, including variability in sample collection technique, stage of infection, or the presence of interfering substances. Collection of multiple samples may be necessary to detect the SARS-CoV-2 virus. If   clinically indicated, consider collecting a new sample for COVID-19 testing or testing for other respiratory viruses.  This test was developed for the detection of nucleic acids from the SARS-CoV-2 virus by RT-PCR in individuals who meet SARS-CoV-2 clinical and/or epidemiological criteria.  This test has not been FDA cleared or approved.  This test has been authorized by FDA under an EUA for use by the authorized laboratory. This test is only authorized for the duration of time that the [place] of the [facility] declares circumstances exist justifying the authorization of the emergency use of   in vitro diagnostic tests for detection of SARS-CoV-2 virus and/or diagnosis of COVID-19 infection under section 564(b)(1) of the Act, 21 U.S.C. 360bbb-3(b)(1), unless the authorization is terminated or revoked sooner.  To learn more about this test, go to [email or web address]  Performed by: TASHI Kellogg [id number], DONALDIA [id number], [address] [place] [address]  : Javier Higginbotham, PhD, [facility], [facility] [place] [facility])  Performed by: CRISTO Kellogg [id number], [address] [place] [address], Victoriano PHOENIX  MD Zita             ECG/EMG Results (most recent)     Procedure Component Value Units Date/Time    ECG 12 Lead [[id number]] Collected: 01/06/22 1326     Updated: 01/06/22 1328     QT Interval 353 ms     Narrative:      HEART RATE= 86  bpm  RR Interval= 696  ms  GA Interval= 138  ms  P Horizontal Axis= -3  deg  P Front Axis= 88  deg  QRSD Interval= 81  ms  QT Interval= 353  ms  QRS Axis= 71  deg  T Wave Axis= 50  deg  - NORMAL ECG -  Sinus rhythm  ST elev, probable normal early repol pattern  No previous ECG available for comparison  Electronically Signed By:   Date and Time of Study: 2022-01-06 13:26:45    Adult Transthoracic Echo Complete W/ Cont if Necessary Per Protocol [[id number]] Resulted: 01/07/22 1005     Updated: 01/07/22 1005     Target HR (85%) 145 bpm      Max. Pred. HR (100%) 170 bpm                   CT Angiogram Chest    Result Date: 1/6/2022  Unremarkable thoracic aorta. No dissection  Electronically Signed By-Milton Leonard On:1/6/2022 5:12 PM This report was finalized on [id number] by  Milton Leonard, .        Estimated Creatinine Clearance: 105 mL/min (by C-G formula based on SCr of 0.92 mg/dL).    Assessment/Plan   Assessment/Plan       Active Hospital Problems    Diagnosis  POA   • Chest pain [R07.9]  Yes      Resolved Hospital Problems   No resolved problems to display.     Chest pain, uncertain etiology--likely secondary to upper respiratory viral infection; cause cannot be excluded especially with family history: Serial troponin; stress Myoview    Human metapneumovirus: Supportive care    Anxiety with insomnia, chronic: Continue Elavil; continue melatonin    Vitamin C; hold co-Q10; continue vitamin D      VTE Prophylaxis -   Mechanical Order History:      Ordered        01/06/22 1809  Place Sequential Compression Device  Once            01/06/22 1809  Maintain Sequential Compression Device  Continuous                    Pharmalogical Order History:     None          CODE STATUS:    Code  Status and Medical Interventions:   Ordered at: 01/06/22 1758     Level Of Support Discussed With:    Patient     Code Status (Patient has no pulse and is not breathing):    CPR (Attempt to Resuscitate)     Medical Interventions (Patient has pulse or is breathing):    Full Support       This patient has been examined wearing personal protective equipment.     I discussed the patient's findings and my recommendations with patient and family.      Signature:Electronically signed by RAUL Nance, 01/07/22, 10:33 AM EST.

## 2022-01-07 NOTE — PLAN OF CARE
Goal Outcome Evaluation:  Plan of Care Reviewed With: patient        Progress: improving  Outcome Summary: New admission from ED with chest pain. Patient remains pain free, no shortness of breath noted. Patient awaiting cardiology consult, stress myoview and echo today. VSS. Will monitor.

## 2022-01-07 NOTE — H&P
[facility]  DISCHARGE SUMMARY        Prepared For PCP:  Provider, No Known    Patient Name: Hayden Mulligan  : 1970  MRN: [id number]      Date of Admission:   2022    Date of Discharge:  2022    Length of stay:  LOS: 0 days     Hospital Course     Presenting Problem:   Chest pain [R07.9]  Infection due to human metapneumovirus (hMPV) [B97.81]  Chest pain, unspecified type [R07.9]      Active Hospital Problems    Diagnosis  POA   • Infection due to human metapneumovirus (hMPV) [B97.81]  Unknown   • Chest pain [R07.9]  Yes      Resolved Hospital Problems   No resolved problems to display.           Hospital Course:  Hayden Mulligan is a 51 y.o. male who presented to the ER with a chief complaint of chest pain which is worse with breathing. The patient was noted to have metapneumovirus. The patient had CT chest without evidence of aortic dissection and without evidence of centralized emboli. The patient underwent stress Myoview which was negative for ischemia. Patient was seen by cardiology and had echocardiogram with results pending. Patient will be discharged with follow-up with cardiology and instructions on finding a new primary care provider. Patient has history of mesenteric panniculitis which was again demonstrated on CT chest. There are also well calcified pulmonary nodules noted which the patient states are being monitored with CT surveillance.          Recommendation for Outpatient Providers:     Review CT chest compared to prior CT for changes in pulmonary nodules, documented as well-opacified.    Follow-up results of echocardiogram without significant preliminary findings.        Reasons For Change In Medications and Indications for New Medications:        Day of Discharge     HPI:   51-year-old male presents to the ER with a chief complaint of chest pain which is worse with inspiration, palpitations with dizziness starting on Thursday morning.  Patient has a history of SVT in  the remote past and complains of palpitations over the last several days.  The patient reports shortness of breath with exertion.  The patient reports recent upper respiratory symptoms which he states started at the end of November and then improved but have worsened over the last several days.  The patient's wife is a teacher.  He states he did a Covid home test in early December which he reports was negative. The pain is described as sharp and is inconsistently exacerbated with inspiration.  Patient reports diaphoresis with activity earlier in the week. 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  The patient had MALS procedure earlier this year.  He has known pulmonary nodule which is under surveillance with scheduled outpatient CT for today.     51-year-old male presents to the ER with a chief complaint of chest pain which is been intermittent over the last week and associated with shortness of breath.    Patient reported family history includes half brother with maternal connection having had MI at age 52 and maternal grandfather with fatal MI in his early 50s.    Vital Signs:   Temp:  [97.4 °F (36.3 °C)-98.1 °F (36.7 °C)] 97.5 °F (36.4 °C)  Heart Rate:  [73-97] 73  Resp:  [10-22] 19  BP: (113-148)/(75-99) 129/82     ROS:  Review of Systems   All other systems reviewed and are negative.    Physical Exam  Vitals and nursing note reviewed.   Constitutional:       Appearance: Normal appearance.   HENT:      Head: Normocephalic and atraumatic.      Right Ear: External ear normal.      Left Ear: External ear normal.      Nose: Nose normal. No congestion or rhinorrhea.      Mouth/Throat:      Mouth: Mucous membranes are moist.   Eyes:      General: No scleral icterus.        Right eye: No discharge.         Left eye: No discharge.      Extraocular Movements: Extraocular movements intact.      Conjunctiva/sclera: Conjunctivae normal.       Pupils: Pupils are equal, round, and reactive to light.   Cardiovascular:      Rate and Rhythm: Normal rate and regular rhythm.      Pulses: Normal pulses.      Heart sounds: Normal heart sounds.   Pulmonary:      Effort: Pulmonary effort is normal.      Breath sounds: Normal breath sounds.   Abdominal:      General: Bowel sounds are normal.      Palpations: Abdomen is soft.   Musculoskeletal:         General: Normal range of motion.      Cervical back: Normal range of motion and neck supple.      Right lower leg: No edema.      Left lower leg: No edema.   Skin:     General: Skin is warm and dry.      Capillary Refill: Capillary refill takes less than 2 seconds.   Neurological:      General: No focal deficit present.      Mental Status: He is alert.   Psychiatric:         Mood and Affect: Mood normal.         Behavior: Behavior normal.         Thought Content: Thought content normal.         Judgment: Judgment normal    Pertinent  and/or Most Recent Results     Results from last 7 days   Lab Units 01/07/22  0421 01/06/22  1413   WBC 10*3/mm3 7.60 8.20   HEMOGLOBIN g/dL 14.8 15.7   HEMATOCRIT % 41.9 45.0   PLATELETS 10*3/mm3 307 373   SODIUM mmol/L 138 138   POTASSIUM mmol/L 4.4 4.0   CHLORIDE mmol/L 100 99   CO2 mmol/L 25.0 26.0   BUN mg/dL 16 13   CREATININE mg/dL 0.92 0.97   GLUCOSE mg/dL 101* 116*   CALCIUM mg/dL 9.5 9.7     Results from last 7 days   Lab Units 01/06/22  1413   BILIRUBIN mg/dL 0.3   ALK PHOS U/L 94   ALT (SGPT) U/L 33   AST (SGOT) U/L 21           Invalid input(s): TG, LDLCALC, LDLREALC  Results from last 7 days   Lab Units 01/06/22 2007 01/06/22  1413   PROBNP pg/mL  --  16.5   TROPONIN T ng/mL <0.010 <0.010       Brief Urine Lab Results     None          Microbiology Results Abnormal     None          CT Angiogram Chest    Result Date: 1/6/2022  Impression: Unremarkable thoracic aorta. No dissection  Electronically Signed By-Milton Leonard On:1/6/2022 5:12 PM This report was finalized on  [id number] by  Milton Leonard, .                          Test Results Pending at Discharge        Procedures Performed           Consults:   Consults     Date and Time Order Name Status Description    1/7/2022 12:36 AM Inpatient Cardiology Consult Completed             Discharge Details        Discharge Medications      Continue These Medications      Instructions Start Date   amitriptyline 10 MG tablet  Commonly known as: ELAVIL   10 mg, Oral, Nightly      ascorbic acid 500 MG tablet  Commonly known as: VITAMIN C   1,200 mg, Oral, Daily      CATS CLAW PO   Oral      coenzyme Q10 100 MG capsule   200 mg, Oral, Daily      melatonin 5 MG tablet tablet   6 mg, Oral, Nightly      vitamin D3 125 MCG (5000 UT) capsule capsule   5,000 Units, Oral, Daily             No Known Allergies      Discharge Disposition:  Home or Self Care    Diet:  Hospital:  Diet Order   Procedures   • Diet Cardiac, Gluten Sensitive; Healthy Heart         Discharge Activity: As tolerated        CODE STATUS:    Code Status and Medical Interventions:   Ordered at: 01/06/22 2818     Level Of Support Discussed With:    Patient     Code Status (Patient has no pulse and is not breathing):    CPR (Attempt to Resuscitate)     Medical Interventions (Patient has pulse or is breathing):    Full Support         Follow-up Appointments  No future appointments.        Condition on Discharge:      Stable      This patient has been examined wearing appropriate Personal Protective Equipment. 01/07/22      Electronically signed by RAUL Nance, 01/07/22, 12:44 PM EST.      Time: I spent  60  minutes on this discharge activity which included face-to-face encounter with the patient/reviewing the data in the system/coordination of the care with the nursing staff as well as consultants/documentation/entering orders.

## 2022-01-09 LAB — QT INTERVAL: 353 MS

## 2022-01-09 NOTE — DISCHARGE SUMMARY
[facility]  DISCHARGE SUMMARY           Prepared For PCP:  Provider, No Known     Patient Name: Hayden Mulligan  : 1970  MRN: [id number]        Date of Admission:   2022     Date of Discharge:  2022     Length of stay:  LOS: 0 days      Hospital Course      Presenting Problem:   Chest pain [R07.9]  Infection due to human metapneumovirus (hMPV) [B97.81]  Chest pain, unspecified type [R07.9]              Active Hospital Problems     Diagnosis   POA   • Infection due to human metapneumovirus (hMPV) [B97.81]   Unknown   • Chest pain [R07.9]   Yes       Resolved Hospital Problems   No resolved problems to display.               Hospital Course:  Hayden Mulligan is a 51 y.o. male who presented to the ER with a chief complaint of chest pain which is worse with breathing. The patient was noted to have metapneumovirus. The patient had CT chest without evidence of aortic dissection and without evidence of centralized emboli. The patient underwent stress Myoview which was negative for ischemia. Patient was seen by cardiology and had echocardiogram with results pending. Patient will be discharged with follow-up with cardiology and instructions on finding a new primary care provider. Patient has history of mesenteric panniculitis which was again demonstrated on CT chest. There are also well calcified pulmonary nodules noted which the patient states are being monitored with CT surveillance.              Recommendation for Outpatient Providers:      Review CT chest compared to prior CT for changes in pulmonary nodules, documented as well-opacified.     Follow-up results of echocardiogram without significant preliminary findings.           Reasons For Change In Medications and Indications for New Medications:           Day of Discharge      HPI:   51-year-old male presents to the ER with a chief complaint of chest pain which is worse with inspiration, palpitations with dizziness starting on Thursday morning.   Patient has a history of SVT in the remote past and complains of palpitations over the last several days.  The patient reports shortness of breath with exertion.  The patient reports recent upper respiratory symptoms which he states started at the end of November and then improved but have worsened over the last several days.  The patient's wife is a teacher.  He states he did a Covid home test in early December which he reports was negative. The pain is described as sharp and is inconsistently exacerbated with inspiration.  Patient reports diaphoresis with activity earlier in the week. 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  The patient had MALS procedure earlier this year.  He has known pulmonary nodule which is under surveillance with scheduled outpatient CT for today.     51-year-old male presents to the ER with a chief complaint of chest pain which is been intermittent over the last week and associated with shortness of breath.    Patient reported family history includes half brother with maternal connection having had MI at age 52 and maternal grandfather with fatal MI in his early 50s.     Vital Signs:   Temp:  [97.4 °F (36.3 °C)-98.1 °F (36.7 °C)] 97.5 °F (36.4 °C)  Heart Rate:  [73-97] 73  Resp:  [10-22] 19  BP: (113-148)/(75-99) 129/82      ROS:  Review of Systems   All other systems reviewed and are negative.     Physical Exam  Vitals and nursing note reviewed.   Constitutional:       Appearance: Normal appearance.   HENT:      Head: Normocephalic and atraumatic.      Right Ear: External ear normal.      Left Ear: External ear normal.      Nose: Nose normal. No congestion or rhinorrhea.      Mouth/Throat:      Mouth: Mucous membranes are moist.   Eyes:      General: No scleral icterus.        Right eye: No discharge.         Left eye: No discharge.      Extraocular Movements: Extraocular movements intact.       Conjunctiva/sclera: Conjunctivae normal.      Pupils: Pupils are equal, round, and reactive to light.   Cardiovascular:      Rate and Rhythm: Normal rate and regular rhythm.      Pulses: Normal pulses.      Heart sounds: Normal heart sounds.   Pulmonary:      Effort: Pulmonary effort is normal.      Breath sounds: Normal breath sounds.   Abdominal:      General: Bowel sounds are normal.      Palpations: Abdomen is soft.   Musculoskeletal:         General: Normal range of motion.      Cervical back: Normal range of motion and neck supple.      Right lower leg: No edema.      Left lower leg: No edema.   Skin:     General: Skin is warm and dry.      Capillary Refill: Capillary refill takes less than 2 seconds.   Neurological:      General: No focal deficit present.      Mental Status: He is alert.   Psychiatric:         Mood and Affect: Mood normal.         Behavior: Behavior normal.         Thought Content: Thought content normal.         Judgment: Judgment normal     Pertinent  and/or Most Recent Results            Results from last 7 days   Lab Units 01/07/22  0421 01/06/22  1413   WBC 10*3/mm3 7.60 8.20   HEMOGLOBIN g/dL 14.8 15.7   HEMATOCRIT % 41.9 45.0   PLATELETS 10*3/mm3 307 373   SODIUM mmol/L 138 138   POTASSIUM mmol/L 4.4 4.0   CHLORIDE mmol/L 100 99   CO2 mmol/L 25.0 26.0   BUN mg/dL 16 13   CREATININE mg/dL 0.92 0.97   GLUCOSE mg/dL 101* 116*   CALCIUM mg/dL 9.5 9.7           Results from last 7 days   Lab Units 01/06/22  1413   BILIRUBIN mg/dL 0.3   ALK PHOS U/L 94   ALT (SGPT) U/L 33   AST (SGOT) U/L 21             Invalid input(s): TG, LDLCALC, LDLREALC        Results from last 7 days   Lab Units 01/06/22 2007 01/06/22  1413   PROBNP pg/mL  --  16.5   TROPONIN T ng/mL <0.010 <0.010             Brief Urine Lab Results      None             Microbiology Results Abnormal      None               Last 30 day radiology impressions    CT Angiogram Chest     Result Date: 1/6/2022  Impression:  Unremarkable thoracic aorta. No dissection  Electronically Signed By-Milton Leonard On:1/6/2022 5:12 PM This report was finalized on [id number] by  Milton Leonard, .                              Test Results Pending at Discharge        Procedures Performed           Consults:           Consults      Date and Time Order Name Status Description     1/7/2022 12:36 AM Inpatient Cardiology Consult Completed                  Discharge Details          Discharge Medications            Continue These Medications      Instructions Start Date   amitriptyline 10 MG tablet  Commonly known as: ELAVIL    10 mg, Oral, Nightly        ascorbic acid 500 MG tablet  Commonly known as: VITAMIN C    1,200 mg, Oral, Daily        CATS CLAW PO    Oral        coenzyme Q10 100 MG capsule    200 mg, Oral, Daily        melatonin 5 MG tablet tablet    6 mg, Oral, Nightly        vitamin D3 125 MCG (5000 UT) capsule capsule    5,000 Units, Oral, Daily                  No Known Allergies        Discharge Disposition:  Home or Self Care     Diet:  Hospital:      Diet Order   Procedures   • Diet Cardiac, Gluten Sensitive; Healthy Heart            Discharge Activity: As tolerated        CODE STATUS:        Code Status and Medical Interventions:   Ordered at: 01/06/22 4273     Level Of Support Discussed With:     Patient     Code Status (Patient has no pulse and is not breathing):     CPR (Attempt to Resuscitate)     Medical Interventions (Patient has pulse or is breathing):     Full Support            Follow-up Appointments  No future appointments.           Condition on Discharge:       Stable        This patient has been examined wearing appropriate Personal Protective Equipment. 01/07/22        Electronically signed by RAUL Nance, 01/07/22, 12:44 PM EST.        Time: I spent  60  minutes on this discharge activity which included face-to-face encounter with the patient/reviewing the data in the system/coordination of the care with the  nursing staff as well as consultants/documentation/entering orders.              Cosigned by: Omar Prado MD at 01/07/22 4020

## 2024-04-17 ENCOUNTER — APPOINTMENT (OUTPATIENT)
Dept: RESPIRATORY THERAPY | Facility: HOSPITAL | Age: 54
End: 2024-04-17
Payer: COMMERCIAL

## 2024-04-17 ENCOUNTER — APPOINTMENT (OUTPATIENT)
Dept: GENERAL RADIOLOGY | Facility: HOSPITAL | Age: 54
End: 2024-04-17
Payer: COMMERCIAL

## 2024-04-17 ENCOUNTER — HOSPITAL ENCOUNTER (EMERGENCY)
Facility: HOSPITAL | Age: 54
Discharge: HOME | End: 2024-04-17
Attending: EMERGENCY MEDICINE
Payer: COMMERCIAL

## 2024-04-17 VITALS
HEART RATE: 64 BPM | OXYGEN SATURATION: 99 % | HEIGHT: 69 IN | TEMPERATURE: 98 F | RESPIRATION RATE: 14 BRPM | DIASTOLIC BLOOD PRESSURE: 94 MMHG | SYSTOLIC BLOOD PRESSURE: 146 MMHG | WEIGHT: 203.93 LBS | BODY MASS INDEX: 30.2 KG/M2

## 2024-04-17 DIAGNOSIS — R00.2 PALPITATIONS: Primary | ICD-10-CM

## 2024-04-17 LAB
ANION GAP SERPL CALCULATED.3IONS-SCNC: 9 MMOL/L (ref 5–15)
BASOPHILS # BLD AUTO: 0.04 10*3/MM3 (ref 0–0.2)
BASOPHILS NFR BLD AUTO: 0.6 % (ref 0–1.5)
BUN SERPL-MCNC: 15 MG/DL (ref 6–20)
BUN/CREAT SERPL: 13.9 (ref 7–25)
CALCIUM SPEC-SCNC: 9.4 MG/DL (ref 8.6–10.5)
CHLORIDE SERPL-SCNC: 101 MMOL/L (ref 98–107)
CO2 SERPL-SCNC: 28 MMOL/L (ref 22–29)
CREAT SERPL-MCNC: 1.08 MG/DL (ref 0.76–1.27)
DEPRECATED RDW RBC AUTO: 40 FL (ref 37–54)
EGFRCR SERPLBLD CKD-EPI 2021: 82.6 ML/MIN/1.73
EOSINOPHIL # BLD AUTO: 0.08 10*3/MM3 (ref 0–0.4)
EOSINOPHIL NFR BLD AUTO: 1.2 % (ref 0.3–6.2)
ERYTHROCYTE [DISTWIDTH] IN BLOOD BY AUTOMATED COUNT: 12.3 % (ref 12.3–15.4)
GLUCOSE SERPL-MCNC: 102 MG/DL (ref 65–99)
HCT VFR BLD AUTO: 40.9 % (ref 37.5–51)
HGB BLD-MCNC: 13.8 G/DL (ref 13–17.7)
IMM GRANULOCYTES # BLD AUTO: 0.02 10*3/MM3 (ref 0–0.05)
IMM GRANULOCYTES NFR BLD AUTO: 0.3 % (ref 0–0.5)
LYMPHOCYTES # BLD AUTO: 1.95 10*3/MM3 (ref 0.7–3.1)
LYMPHOCYTES NFR BLD AUTO: 28.1 % (ref 19.6–45.3)
MCH RBC QN AUTO: 29.8 PG (ref 26.6–33)
MCHC RBC AUTO-ENTMCNC: 33.7 G/DL (ref 31.5–35.7)
MCV RBC AUTO: 88.3 FL (ref 79–97)
MONOCYTES # BLD AUTO: 0.81 10*3/MM3 (ref 0.1–0.9)
MONOCYTES NFR BLD AUTO: 11.7 % (ref 5–12)
NEUTROPHILS NFR BLD AUTO: 4.05 10*3/MM3 (ref 1.7–7)
NEUTROPHILS NFR BLD AUTO: 58.1 % (ref 42.7–76)
NRBC BLD AUTO-RTO: 0 /100 WBC (ref 0–0.2)
PLATELET # BLD AUTO: 289 10*3/MM3 (ref 140–450)
PMV BLD AUTO: 9.2 FL (ref 6–12)
POTASSIUM SERPL-SCNC: 4.1 MMOL/L (ref 3.5–5.2)
RBC # BLD AUTO: 4.63 10*6/MM3 (ref 4.14–5.8)
SODIUM SERPL-SCNC: 138 MMOL/L (ref 136–145)
TSH SERPL DL<=0.05 MIU/L-ACNC: 3.89 UIU/ML (ref 0.27–4.2)
WBC NRBC COR # BLD AUTO: 6.95 10*3/MM3 (ref 3.4–10.8)

## 2024-04-17 PROCEDURE — 84443 ASSAY THYROID STIM HORMONE: CPT | Performed by: EMERGENCY MEDICINE

## 2024-04-17 PROCEDURE — 71045 X-RAY EXAM CHEST 1 VIEW: CPT

## 2024-04-17 PROCEDURE — 93005 ELECTROCARDIOGRAM TRACING: CPT

## 2024-04-17 PROCEDURE — 85025 COMPLETE CBC W/AUTO DIFF WBC: CPT | Performed by: EMERGENCY MEDICINE

## 2024-04-17 PROCEDURE — 93005 ELECTROCARDIOGRAM TRACING: CPT | Performed by: EMERGENCY MEDICINE

## 2024-04-17 PROCEDURE — 93242 EXT ECG>48HR<7D RECORDING: CPT

## 2024-04-17 PROCEDURE — 80048 BASIC METABOLIC PNL TOTAL CA: CPT | Performed by: EMERGENCY MEDICINE

## 2024-04-17 PROCEDURE — 99284 EMERGENCY DEPT VISIT MOD MDM: CPT

## 2024-04-17 RX ORDER — SODIUM CHLORIDE 0.9 % (FLUSH) 0.9 %
10 SYRINGE (ML) INJECTION AS NEEDED
Status: DISCONTINUED | OUTPATIENT
Start: 2024-04-17 | End: 2024-04-18 | Stop reason: HOSPADM

## 2024-04-18 LAB
QT INTERVAL: 383 MS
QTC INTERVAL: 408 MS

## 2024-04-18 NOTE — ED PROVIDER NOTES
Subjective   History of Present Illness  Patient is a 51-year-old male complaining of palpitations intermittently for the past 24 hours.  States he thinks he may have had for the past several weeks intermittently.  Denies cough fever chest pain shortness of breath or other complaint.      Review of Systems    No past medical history on file.    No Known Allergies    No past surgical history on file.    Family History   Problem Relation Age of Onset    Heart disease Daughter     Heart disease Paternal Grandfather        Social History     Socioeconomic History    Marital status:    Tobacco Use    Smoking status: Never    Smokeless tobacco: Never   Substance and Sexual Activity    Alcohol use: Not Currently    Drug use: Never    Sexual activity: Defer           Objective   Physical Exam  Neck has no adenopathy JVD or bruits.  Lungs are clear.  Heart has a regular rate rhythm without murmur rub or gallop.  Chest is nontender.  Abdomen soft nontender.  Extremity exam is unremarkable.  Procedures     My EKG interpretation is normal sinus rhythm at a rate of 78 with no acute ST change.  He does have an occasional PVC.      ED Course      Results for orders placed or performed during the hospital encounter of 04/17/24   Basic Metabolic Panel    Specimen: Blood   Result Value Ref Range    Glucose 102 (H) 65 - 99 mg/dL    BUN 15 6 - 20 mg/dL    Creatinine 1.08 0.76 - 1.27 mg/dL    Sodium 138 136 - 145 mmol/L    Potassium 4.1 3.5 - 5.2 mmol/L    Chloride 101 98 - 107 mmol/L    CO2 28.0 22.0 - 29.0 mmol/L    Calcium 9.4 8.6 - 10.5 mg/dL    BUN/Creatinine Ratio 13.9 7.0 - 25.0    Anion Gap 9.0 5.0 - 15.0 mmol/L    eGFR 82.6 >60.0 mL/min/1.73   TSH    Specimen: Blood   Result Value Ref Range    TSH 3.890 0.270 - 4.200 uIU/mL   CBC Auto Differential    Specimen: Blood   Result Value Ref Range    WBC 6.95 3.40 - 10.80 10*3/mm3    RBC 4.63 4.14 - 5.80 10*6/mm3    Hemoglobin 13.8 13.0 - 17.7 g/dL    Hematocrit 40.9 37.5 -  51.0 %    MCV 88.3 79.0 - 97.0 fL    MCH 29.8 26.6 - 33.0 pg    MCHC 33.7 31.5 - 35.7 g/dL    RDW 12.3 12.3 - 15.4 %    RDW-SD 40.0 37.0 - 54.0 fl    MPV 9.2 6.0 - 12.0 fL    Platelets 289 140 - 450 10*3/mm3    Neutrophil % 58.1 42.7 - 76.0 %    Lymphocyte % 28.1 19.6 - 45.3 %    Monocyte % 11.7 5.0 - 12.0 %    Eosinophil % 1.2 0.3 - 6.2 %    Basophil % 0.6 0.0 - 1.5 %    Immature Grans % 0.3 0.0 - 0.5 %    Neutrophils, Absolute 4.05 1.70 - 7.00 10*3/mm3    Lymphocytes, Absolute 1.95 0.70 - 3.10 10*3/mm3    Monocytes, Absolute 0.81 0.10 - 0.90 10*3/mm3    Eosinophils, Absolute 0.08 0.00 - 0.40 10*3/mm3    Basophils, Absolute 0.04 0.00 - 0.20 10*3/mm3    Immature Grans, Absolute 0.02 0.00 - 0.05 10*3/mm3    nRBC 0.0 0.0 - 0.2 /100 WBC   ECG 12 Lead Chest Pain   Result Value Ref Range    QT Interval 383 ms    QTC Interval 408 ms     XR Chest 1 View    Result Date: 4/17/2024  Impression: No acute abnormality. Electronically Signed: Keny Choudhury DO  4/17/2024 9:24 PM EDT  Workstation ID: [id number]                                            Medical Decision Making  Chest x-ray interpretation shows no cardiomegaly fusion or infiltrate.  BMP shows no renal insufficiency or electrolyte abnormality.  TSH 3.89.  CBC shows no leukocytosis no left shift and no anemia.  Patient be discharged.  Will be placed on an M cot and will follow with his MD for recheck.    Amount and/or Complexity of Data Reviewed  Labs: ordered. Decision-making details documented in ED Course.  Radiology: ordered and independent interpretation performed.  ECG/medicine tests: ordered and independent interpretation performed.    Risk  Prescription drug management.        Final diagnoses:   Palpitations       ED Disposition  ED Disposition       ED Disposition   Discharge    Condition   Stable    Comment   --               No follow-up provider specified.       Medication List      No changes were made to your prescriptions during this visit.             Marino Bray MD  04/17/24 [id number]

## 2024-04-18 NOTE — ED NOTES
For the last week, pt states he has felt like his heart has been skipping a bat. Pt has also had chest pressure that is changes from the right side, left side, and center. Pt states his left arm was numb earlier.